# Patient Record
Sex: FEMALE | Race: WHITE | Employment: FULL TIME | URBAN - METROPOLITAN AREA
[De-identification: names, ages, dates, MRNs, and addresses within clinical notes are randomized per-mention and may not be internally consistent; named-entity substitution may affect disease eponyms.]

---

## 2017-02-02 RX ORDER — SERTRALINE HYDROCHLORIDE 100 MG/1
TABLET, FILM COATED ORAL
Qty: 30 TABLET | Refills: 0 | Status: SHIPPED | OUTPATIENT
Start: 2017-02-02 | End: 2019-12-11 | Stop reason: CLARIF

## 2017-05-19 RX ORDER — SERTRALINE HYDROCHLORIDE 100 MG/1
TABLET, FILM COATED ORAL
Qty: 30 TABLET | Refills: 5 | Status: SHIPPED | OUTPATIENT
Start: 2017-05-19 | End: 2018-01-30 | Stop reason: SDUPTHER

## 2018-01-18 RX ORDER — SERTRALINE HYDROCHLORIDE 100 MG/1
TABLET, FILM COATED ORAL
Qty: 30 TABLET | Refills: 4 | OUTPATIENT
Start: 2018-01-18

## 2018-01-19 RX ORDER — SERTRALINE HYDROCHLORIDE 100 MG/1
TABLET, FILM COATED ORAL
Qty: 30 TABLET | Refills: 5 | OUTPATIENT
Start: 2018-01-19

## 2018-01-30 RX ORDER — SERTRALINE HYDROCHLORIDE 100 MG/1
TABLET, FILM COATED ORAL
Qty: 30 TABLET | Refills: 4 | Status: SHIPPED | OUTPATIENT
Start: 2018-01-30 | End: 2018-09-07 | Stop reason: SDUPTHER

## 2018-09-07 RX ORDER — SERTRALINE HYDROCHLORIDE 100 MG/1
TABLET, FILM COATED ORAL
Qty: 30 TABLET | Refills: 3 | Status: SHIPPED | OUTPATIENT
Start: 2018-09-07 | End: 2019-03-13 | Stop reason: SDUPTHER

## 2019-03-06 RX ORDER — SERTRALINE HYDROCHLORIDE 100 MG/1
TABLET, FILM COATED ORAL
Qty: 30 TABLET | Refills: 2 | OUTPATIENT
Start: 2019-03-06

## 2019-03-13 ENCOUNTER — OFFICE VISIT (OUTPATIENT)
Dept: FAMILY MEDICINE CLINIC | Age: 34
End: 2019-03-13
Payer: COMMERCIAL

## 2019-03-13 VITALS
HEART RATE: 96 BPM | DIASTOLIC BLOOD PRESSURE: 82 MMHG | SYSTOLIC BLOOD PRESSURE: 120 MMHG | OXYGEN SATURATION: 98 % | TEMPERATURE: 97.3 F | HEIGHT: 68 IN | WEIGHT: 243 LBS | BODY MASS INDEX: 36.83 KG/M2 | RESPIRATION RATE: 16 BRPM

## 2019-03-13 DIAGNOSIS — R63.5 WEIGHT GAIN: ICD-10-CM

## 2019-03-13 DIAGNOSIS — F32.89 OTHER DEPRESSION: Primary | ICD-10-CM

## 2019-03-13 PROCEDURE — 99214 OFFICE O/P EST MOD 30 MIN: CPT | Performed by: FAMILY MEDICINE

## 2019-03-13 RX ORDER — SERTRALINE HYDROCHLORIDE 100 MG/1
TABLET, FILM COATED ORAL
Qty: 90 TABLET | Refills: 1 | Status: SHIPPED | OUTPATIENT
Start: 2019-03-13 | End: 2019-11-27 | Stop reason: SDUPTHER

## 2019-03-13 ASSESSMENT — PATIENT HEALTH QUESTIONNAIRE - PHQ9
SUM OF ALL RESPONSES TO PHQ QUESTIONS 1-9: 0
1. LITTLE INTEREST OR PLEASURE IN DOING THINGS: 0
SUM OF ALL RESPONSES TO PHQ QUESTIONS 1-9: 0
SUM OF ALL RESPONSES TO PHQ9 QUESTIONS 1 & 2: 0
2. FEELING DOWN, DEPRESSED OR HOPELESS: 0

## 2019-03-14 ASSESSMENT — ENCOUNTER SYMPTOMS
SWOLLEN GLANDS: 0
ABDOMINAL PAIN: 0
VISUAL CHANGE: 0
SORE THROAT: 0
NAUSEA: 0
CHANGE IN BOWEL HABIT: 0
VOMITING: 0

## 2019-11-27 RX ORDER — SERTRALINE HYDROCHLORIDE 100 MG/1
TABLET, FILM COATED ORAL
Qty: 30 TABLET | Refills: 0 | Status: SHIPPED | OUTPATIENT
Start: 2019-11-27 | End: 2019-12-11 | Stop reason: CLARIF

## 2019-12-03 RX ORDER — SERTRALINE HYDROCHLORIDE 100 MG/1
TABLET, FILM COATED ORAL
Qty: 30 TABLET | Refills: 3 | Status: SHIPPED | OUTPATIENT
Start: 2019-12-03 | End: 2019-12-11 | Stop reason: SDUPTHER

## 2019-12-11 ENCOUNTER — OFFICE VISIT (OUTPATIENT)
Dept: FAMILY MEDICINE CLINIC | Age: 34
End: 2019-12-11
Payer: COMMERCIAL

## 2019-12-11 VITALS
SYSTOLIC BLOOD PRESSURE: 114 MMHG | BODY MASS INDEX: 36.68 KG/M2 | HEART RATE: 93 BPM | DIASTOLIC BLOOD PRESSURE: 70 MMHG | HEIGHT: 68 IN | RESPIRATION RATE: 16 BRPM | WEIGHT: 242 LBS | OXYGEN SATURATION: 98 % | TEMPERATURE: 97.6 F

## 2019-12-11 DIAGNOSIS — F32.89 OTHER DEPRESSION: Primary | ICD-10-CM

## 2019-12-11 PROCEDURE — 99213 OFFICE O/P EST LOW 20 MIN: CPT | Performed by: FAMILY MEDICINE

## 2019-12-11 RX ORDER — SERTRALINE HYDROCHLORIDE 100 MG/1
TABLET, FILM COATED ORAL
Qty: 30 TABLET | Refills: 3 | Status: CANCELLED | OUTPATIENT
Start: 2019-12-11

## 2019-12-11 RX ORDER — SERTRALINE HYDROCHLORIDE 100 MG/1
TABLET, FILM COATED ORAL
Qty: 30 TABLET | Refills: 11 | Status: SHIPPED | OUTPATIENT
Start: 2019-12-11 | End: 2020-12-28 | Stop reason: SDUPTHER

## 2020-12-28 ENCOUNTER — VIRTUAL VISIT (OUTPATIENT)
Dept: FAMILY MEDICINE CLINIC | Age: 35
End: 2020-12-28
Payer: COMMERCIAL

## 2020-12-28 PROCEDURE — 99441 PR PHYS/QHP TELEPHONE EVALUATION 5-10 MIN: CPT | Performed by: FAMILY MEDICINE

## 2020-12-28 RX ORDER — SERTRALINE HYDROCHLORIDE 100 MG/1
TABLET, FILM COATED ORAL
Qty: 30 TABLET | Refills: 11 | Status: SHIPPED | OUTPATIENT
Start: 2020-12-28 | End: 2022-02-11 | Stop reason: SDUPTHER

## 2020-12-28 RX ORDER — SERTRALINE HYDROCHLORIDE 100 MG/1
TABLET, FILM COATED ORAL
Qty: 30 TABLET | Refills: 0 | OUTPATIENT
Start: 2020-12-28

## 2020-12-28 RX ORDER — FERROUS SULFATE 325(65) MG
325 TABLET ORAL
COMMUNITY

## 2020-12-28 ASSESSMENT — PATIENT HEALTH QUESTIONNAIRE - PHQ9
1. LITTLE INTEREST OR PLEASURE IN DOING THINGS: 0
SUM OF ALL RESPONSES TO PHQ QUESTIONS 1-9: 0
2. FEELING DOWN, DEPRESSED OR HOPELESS: 0
SUM OF ALL RESPONSES TO PHQ QUESTIONS 1-9: 0
SUM OF ALL RESPONSES TO PHQ QUESTIONS 1-9: 0
SUM OF ALL RESPONSES TO PHQ9 QUESTIONS 1 & 2: 0

## 2020-12-28 ASSESSMENT — ENCOUNTER SYMPTOMS
NAUSEA: 0
VOMITING: 0
CHEST TIGHTNESS: 0

## 2020-12-28 NOTE — PROGRESS NOTES
Subjective:      Patient ID: Lee Ochoa is a 38 y.o. female. Lee Ochoa is a 38 y.o. female evaluated via telephone on 12/28/2020. Consent:  She and/or health care decision maker is aware that that she may receive a bill for this telephone service, depending on her insurance coverage, and has provided verbal consent to proceed: Yes      Documentation:  I communicated with the patient and/or health care decision maker about cc. Details of this discussion including any medical advice provided: yes      I affirm this is a Patient Initiated Episode with a Patient who has not had a related appointment within my department in the past 7 days or scheduled within the next 24 hours. Patient identification was verified at the start of the visit: Yes    Total Time: minutes: 5-10 minutes    Note: not billable if this call serves to triage the patient into an appointment for the relevant concern      Marla Laws  This is a chronic (fu depression w anxiety) problem. The current episode started more than 1 year ago. The problem occurs constantly. The problem has been unchanged. Pertinent negatives include no chest pain, fatigue, nausea or vomiting. Nothing aggravates the symptoms. Treatments tried: sertraline  The treatment provided significant relief. Review of Systems   Constitutional: Negative for activity change, fatigue and unexpected weight change. Respiratory: Negative for chest tightness. Cardiovascular: Negative for chest pain and palpitations. Gastrointestinal: Negative for nausea and vomiting. Psychiatric/Behavioral: Negative for behavioral problems, confusion, dysphoric mood and sleep disturbance. The patient is not nervous/anxious. Objective: Wt 230  No BP reported    Physical Exam  Vitals signs reviewed. Constitutional:       General: She is not in acute distress.   HENT:      Head:      Comments: Hearing intact to nml conversation     Neurological: Mental Status: She is alert and oriented to person, place, and time. Psychiatric:         Mood and Affect: Mood normal.         Assessment:       Diagnosis Orders   1. Depression with anxiety             Plan:       On remission with current tx  Continue same medications no changes needed at this time   We can consider tape down med if needed in future      Fu in 6 months               Ray Miller MD

## 2022-02-11 ENCOUNTER — OFFICE VISIT (OUTPATIENT)
Dept: FAMILY MEDICINE CLINIC | Age: 37
End: 2022-02-11
Payer: COMMERCIAL

## 2022-02-11 VITALS
TEMPERATURE: 98.9 F | RESPIRATION RATE: 16 BRPM | DIASTOLIC BLOOD PRESSURE: 84 MMHG | BODY MASS INDEX: 36.83 KG/M2 | OXYGEN SATURATION: 98 % | WEIGHT: 243 LBS | SYSTOLIC BLOOD PRESSURE: 124 MMHG | HEART RATE: 105 BPM | HEIGHT: 68 IN

## 2022-02-11 DIAGNOSIS — E55.9 VITAMIN D DEFICIENCY: ICD-10-CM

## 2022-02-11 DIAGNOSIS — R53.83 FATIGUE, UNSPECIFIED TYPE: ICD-10-CM

## 2022-02-11 DIAGNOSIS — F41.8 DEPRESSION WITH ANXIETY: Primary | ICD-10-CM

## 2022-02-11 PROCEDURE — 99214 OFFICE O/P EST MOD 30 MIN: CPT | Performed by: FAMILY MEDICINE

## 2022-02-11 RX ORDER — ARIPIPRAZOLE 5 MG/1
5 TABLET ORAL DAILY
Qty: 30 TABLET | Refills: 3 | Status: SHIPPED | OUTPATIENT
Start: 2022-02-11 | End: 2022-03-11 | Stop reason: SDUPTHER

## 2022-02-11 RX ORDER — SERTRALINE HYDROCHLORIDE 100 MG/1
TABLET, FILM COATED ORAL
Qty: 30 TABLET | Refills: 11 | Status: SHIPPED | OUTPATIENT
Start: 2022-02-11 | End: 2022-07-11 | Stop reason: DRUGHIGH

## 2022-02-11 ASSESSMENT — PATIENT HEALTH QUESTIONNAIRE - PHQ9
3. TROUBLE FALLING OR STAYING ASLEEP: 3
10. IF YOU CHECKED OFF ANY PROBLEMS, HOW DIFFICULT HAVE THESE PROBLEMS MADE IT FOR YOU TO DO YOUR WORK, TAKE CARE OF THINGS AT HOME, OR GET ALONG WITH OTHER PEOPLE: 0
1. LITTLE INTEREST OR PLEASURE IN DOING THINGS: 1
7. TROUBLE CONCENTRATING ON THINGS, SUCH AS READING THE NEWSPAPER OR WATCHING TELEVISION: 1
4. FEELING TIRED OR HAVING LITTLE ENERGY: 3
9. THOUGHTS THAT YOU WOULD BE BETTER OFF DEAD, OR OF HURTING YOURSELF: 0
5. POOR APPETITE OR OVEREATING: 3
SUM OF ALL RESPONSES TO PHQ QUESTIONS 1-9: 17
SUM OF ALL RESPONSES TO PHQ QUESTIONS 1-9: 17
6. FEELING BAD ABOUT YOURSELF - OR THAT YOU ARE A FAILURE OR HAVE LET YOURSELF OR YOUR FAMILY DOWN: 1
2. FEELING DOWN, DEPRESSED OR HOPELESS: 3
SUM OF ALL RESPONSES TO PHQ QUESTIONS 1-9: 17
SUM OF ALL RESPONSES TO PHQ QUESTIONS 1-9: 17
SUM OF ALL RESPONSES TO PHQ9 QUESTIONS 1 & 2: 4
8. MOVING OR SPEAKING SO SLOWLY THAT OTHER PEOPLE COULD HAVE NOTICED. OR THE OPPOSITE, BEING SO FIGETY OR RESTLESS THAT YOU HAVE BEEN MOVING AROUND A LOT MORE THAN USUAL: 2

## 2022-02-11 NOTE — PROGRESS NOTES
Subjective:      Patient ID: Henry Ace is a 43 y.o. female. Fatigue  This is a chronic problem. The current episode started more than 1 year ago. The problem occurs intermittently. The problem has been unchanged. Associated symptoms include fatigue. Pertinent negatives include no chest pain. Exacerbated by: depressed mood. Other  This is a chronic (fu depression/anxiety ) problem. The current episode started more than 1 year ago. The problem occurs intermittently. The problem has been gradually worsening. Associated symptoms include fatigue. Pertinent negatives include no chest pain. Exacerbated by: stressors at home. Treatments tried: zoloft 100 mg. The treatment provided mild relief. Hx of Vit d def  Current tx: none       Review of Systems   Constitutional: Positive for fatigue. Negative for activity change, appetite change and unexpected weight change. Respiratory: Negative for chest tightness and shortness of breath. Cardiovascular: Negative for chest pain and palpitations. Skin: Negative for color change. Psychiatric/Behavioral: Positive for decreased concentration and dysphoric mood. Negative for behavioral problems, self-injury, sleep disturbance and suicidal ideas. The patient is nervous/anxious. Objective:  Blood pressure 124/84, pulse 105, temperature 98.9 °F (37.2 °C), temperature source Tympanic, resp. rate 16, height 5' 8\" (1.727 m), weight 243 lb (110.2 kg), last menstrual period 01/21/2022, SpO2 98 %, not currently breastfeeding. Physical Exam  Vitals and nursing note reviewed. Constitutional:       General: She is not in acute distress. Appearance: She is well-developed. She is obese. She is not ill-appearing, toxic-appearing or diaphoretic. HENT:      Head: Normocephalic and atraumatic. Mouth/Throat:      Mouth: Mucous membranes are moist.      Pharynx: Oropharynx is clear. No oropharyngeal exudate or posterior oropharyngeal erythema.    Eyes: General: No scleral icterus. Right eye: No discharge. Left eye: No discharge. Extraocular Movements: Extraocular movements intact. Conjunctiva/sclera: Conjunctivae normal.      Pupils: Pupils are equal, round, and reactive to light. Neck:      Thyroid: No thyromegaly. Vascular: No carotid bruit or JVD. Trachea: No tracheal deviation. Cardiovascular:      Rate and Rhythm: Normal rate and regular rhythm. Pulses: Normal pulses. Heart sounds: Normal heart sounds. No murmur heard. No friction rub. No gallop. Pulmonary:      Effort: Pulmonary effort is normal. No respiratory distress. Breath sounds: Normal breath sounds. No stridor. No wheezing, rhonchi or rales. Chest:      Chest wall: No tenderness. Musculoskeletal:         General: No swelling, tenderness, deformity or signs of injury. Normal range of motion. Cervical back: Normal range of motion and neck supple. No muscular tenderness. Left lower leg: No edema. Lymphadenopathy:      Cervical: No cervical adenopathy. Skin:     General: Skin is warm. Capillary Refill: Capillary refill takes less than 2 seconds. Coloration: Skin is not jaundiced or pale. Findings: No erythema or rash. Neurological:      General: No focal deficit present. Mental Status: She is alert and oriented to person, place, and time. Mental status is at baseline. Cranial Nerves: No cranial nerve deficit. Sensory: No sensory deficit. Motor: No weakness or abnormal muscle tone. Coordination: Coordination normal.      Gait: Gait normal.      Deep Tendon Reflexes: Reflexes are normal and symmetric. Psychiatric:         Attention and Perception: Attention normal.         Mood and Affect: Mood is depressed. Speech: Speech normal.         Behavior: Behavior normal. Behavior is cooperative. Thought Content:  Thought content normal.         Judgment: Judgment normal. Assessment:        Diagnosis Orders   1. Depression with anxiety  sertraline (ZOLOFT) 100 MG tablet    ARIPiprazole (ABILIFY) 5 MG tablet   2. Fatigue, unspecified type  CBC Auto Differential    TSH without Reflex   3. Vitamin D deficiency  Vitamin D 25 Hydroxy           Plan:      1. Deteriorated  Add abilify , continue ssri   Fu 4 weeks    2.  NOS, check blood work as recommended  Fu 4 weeks              Kashif Linares MD

## 2022-02-12 LAB
BASOPHILS ABSOLUTE: 0.1 K/UL (ref 0–0.2)
BASOPHILS RELATIVE PERCENT: 0.6 %
EOSINOPHILS ABSOLUTE: 0.2 K/UL (ref 0–0.6)
EOSINOPHILS RELATIVE PERCENT: 2.1 %
HCT VFR BLD CALC: 36.8 % (ref 36–48)
HEMOGLOBIN: 12.1 G/DL (ref 12–16)
LYMPHOCYTES ABSOLUTE: 1.4 K/UL (ref 1–5.1)
LYMPHOCYTES RELATIVE PERCENT: 16.5 %
MCH RBC QN AUTO: 25.1 PG (ref 26–34)
MCHC RBC AUTO-ENTMCNC: 32.8 G/DL (ref 31–36)
MCV RBC AUTO: 76.4 FL (ref 80–100)
MONOCYTES ABSOLUTE: 0.4 K/UL (ref 0–1.3)
MONOCYTES RELATIVE PERCENT: 5.1 %
NEUTROPHILS ABSOLUTE: 6.6 K/UL (ref 1.7–7.7)
NEUTROPHILS RELATIVE PERCENT: 75.7 %
PDW BLD-RTO: 16 % (ref 12.4–15.4)
PLATELET # BLD: 276 K/UL (ref 135–450)
PMV BLD AUTO: 9.5 FL (ref 5–10.5)
RBC # BLD: 4.82 M/UL (ref 4–5.2)
TSH SERPL DL<=0.05 MIU/L-ACNC: 1.29 UIU/ML (ref 0.27–4.2)
VITAMIN D 25-HYDROXY: 12 NG/ML
WBC # BLD: 8.7 K/UL (ref 4–11)

## 2022-02-14 ASSESSMENT — ENCOUNTER SYMPTOMS
SHORTNESS OF BREATH: 0
CHEST TIGHTNESS: 0
COLOR CHANGE: 0

## 2022-02-15 DIAGNOSIS — E55.9 VITAMIN D DEFICIENCY: Primary | ICD-10-CM

## 2022-02-15 RX ORDER — ERGOCALCIFEROL (VITAMIN D2) 1250 MCG
50000 CAPSULE ORAL WEEKLY
Qty: 12 CAPSULE | Refills: 0 | OUTPATIENT
Start: 2022-02-15 | End: 2022-02-22 | Stop reason: SDUPTHER

## 2022-02-22 ENCOUNTER — PATIENT MESSAGE (OUTPATIENT)
Dept: FAMILY MEDICINE CLINIC | Age: 37
End: 2022-02-22

## 2022-02-22 DIAGNOSIS — E55.9 VITAMIN D DEFICIENCY: ICD-10-CM

## 2022-02-22 NOTE — TELEPHONE ENCOUNTER
Medication:   Requested Prescriptions     Pending Prescriptions Disp Refills    ergocalciferol (DRISDOL) 1.25 MG (27368 UT) capsule 12 capsule 0     Sig: Take 1 capsule by mouth once a week for 12 days        Last Filled:      Patient Phone Number: [phone] (home)     Last appt: 2/11/2022   Next appt: 3/11/2022    Last OARRS: No flowsheet data found.

## 2022-02-22 NOTE — TELEPHONE ENCOUNTER
From: Boubacar Cochran  To: Dr. Hess Smart: 2/22/2022 11:32 AM EST  Subject: Vitamin D    On Tuesday 2/15 I was called with my blood test results and was told that a prescription was bring called in and the pharmacy hasn't received it.

## 2022-02-23 RX ORDER — ERGOCALCIFEROL (VITAMIN D2) 1250 MCG
50000 CAPSULE ORAL WEEKLY
Qty: 12 CAPSULE | Refills: 0 | Status: SHIPPED | OUTPATIENT
Start: 2022-02-23 | End: 2022-03-22

## 2022-03-18 ENCOUNTER — TELEPHONE (OUTPATIENT)
Dept: FAMILY MEDICINE CLINIC | Age: 37
End: 2022-03-18

## 2022-03-18 RX ORDER — ARIPIPRAZOLE 10 MG/1
10 TABLET ORAL DAILY
Qty: 30 TABLET | Refills: 3 | Status: SHIPPED | OUTPATIENT
Start: 2022-03-18 | End: 2022-07-11 | Stop reason: SDUPTHER

## 2022-03-18 NOTE — TELEPHONE ENCOUNTER
rx transmitted electronically to the pharmacy via [facility]   Let patient know and verify pharmacy

## 2022-03-18 NOTE — TELEPHONE ENCOUNTER
Pharmacy tech states the abilify 5mg take 2 tabs daily is not covered for pt to take 2 tabs. Requesting for Abilify 10mg 1 tab daily to be sent over.   Please advise  Last OV:  3/11/2022

## 2022-03-22 DIAGNOSIS — F51.04 PSYCHOPHYSIOLOGICAL INSOMNIA: ICD-10-CM

## 2022-03-22 DIAGNOSIS — E55.9 VITAMIN D DEFICIENCY: ICD-10-CM

## 2022-03-22 RX ORDER — HYDROXYZINE HYDROCHLORIDE 25 MG/1
25 TABLET, FILM COATED ORAL NIGHTLY
Qty: 30 TABLET | Refills: 0 | Status: SHIPPED | OUTPATIENT
Start: 2022-03-22 | End: 2022-04-01 | Stop reason: ALTCHOICE

## 2022-03-22 RX ORDER — ERGOCALCIFEROL 1.25 MG/1
CAPSULE ORAL
Qty: 12 CAPSULE | Refills: 0 | Status: SHIPPED | OUTPATIENT
Start: 2022-03-22 | End: 2022-05-31

## 2022-03-22 NOTE — TELEPHONE ENCOUNTER
Medication:   Requested Prescriptions     Pending Prescriptions Disp Refills    vitamin D (ERGOCALCIFEROL) 1.25 MG (05839 UT) CAPS capsule [Pharmacy Med Name: Vitamin D (Ergocalciferol) Oral Capsule 1.25 MG (84462 UT)] 12 capsule 0     Sig: TAKE 1 CAPSULE BY MOUTH ONE TIME A WEEK FOR 12 WEEKS    hydrOXYzine (ATARAX) 25 MG tablet [Pharmacy Med Name: hydrOXYzine HCl Oral Tablet 25 MG] 30 tablet 0     Sig: TAKE 1 TABLET BY MOUTH NIGHTLY. Last Filled:      Patient Phone Number: [phone] (home)     Last appt: 3/11/2022   Next appt: 4/1/2022    Last OARRS: No flowsheet data found.

## 2022-04-01 ENCOUNTER — OFFICE VISIT (OUTPATIENT)
Dept: FAMILY MEDICINE CLINIC | Age: 37
End: 2022-04-01
Payer: COMMERCIAL

## 2022-04-01 VITALS
BODY MASS INDEX: 36.53 KG/M2 | HEART RATE: 83 BPM | WEIGHT: 241 LBS | HEIGHT: 68 IN | RESPIRATION RATE: 14 BRPM | OXYGEN SATURATION: 98 % | TEMPERATURE: 97.2 F | SYSTOLIC BLOOD PRESSURE: 122 MMHG | DIASTOLIC BLOOD PRESSURE: 80 MMHG

## 2022-04-01 DIAGNOSIS — Z00.00 ENCOUNTER FOR WELL ADULT EXAM WITHOUT ABNORMAL FINDINGS: ICD-10-CM

## 2022-04-01 DIAGNOSIS — E55.9 VITAMIN D DEFICIENCY: ICD-10-CM

## 2022-04-01 DIAGNOSIS — F51.04 PSYCHOPHYSIOLOGICAL INSOMNIA: ICD-10-CM

## 2022-04-01 DIAGNOSIS — Z00.00 WELL ADULT EXAM: Primary | ICD-10-CM

## 2022-04-01 DIAGNOSIS — F41.8 DEPRESSION WITH ANXIETY: ICD-10-CM

## 2022-04-01 PROCEDURE — 99213 OFFICE O/P EST LOW 20 MIN: CPT | Performed by: FAMILY MEDICINE

## 2022-04-01 PROCEDURE — 99395 PREV VISIT EST AGE 18-39: CPT | Performed by: FAMILY MEDICINE

## 2022-04-01 RX ORDER — TRAZODONE HYDROCHLORIDE 50 MG/1
50 TABLET ORAL NIGHTLY
Qty: 30 TABLET | Refills: 1 | Status: SHIPPED | OUTPATIENT
Start: 2022-04-01 | End: 2022-07-11 | Stop reason: ALTCHOICE

## 2022-04-01 ASSESSMENT — PATIENT HEALTH QUESTIONNAIRE - PHQ9
1. LITTLE INTEREST OR PLEASURE IN DOING THINGS: 0
2. FEELING DOWN, DEPRESSED OR HOPELESS: 0
SUM OF ALL RESPONSES TO PHQ QUESTIONS 1-9: 0
SUM OF ALL RESPONSES TO PHQ9 QUESTIONS 1 & 2: 0
SUM OF ALL RESPONSES TO PHQ QUESTIONS 1-9: 0

## 2022-04-01 ASSESSMENT — ENCOUNTER SYMPTOMS
RHINORRHEA: 0
WHEEZING: 0
TROUBLE SWALLOWING: 0
CHEST TIGHTNESS: 0
NAUSEA: 0
ABDOMINAL PAIN: 0
EYE REDNESS: 0
EYE ITCHING: 0
SHORTNESS OF BREATH: 0
VOMITING: 0

## 2022-04-01 NOTE — PROGRESS NOTES
UT) CAPS capsule TAKE 1 CAPSULE BY MOUTH ONE TIME A WEEK FOR 12 Christine Porter MD   ARIPiprazole (ABILIFY) 10 MG tablet Take 1 tablet by mouth daily  Sharilyn Sicard, MD   sertraline (ZOLOFT) 100 MG tablet One po qd  Sharilyn Sicard, MD   ferrous sulfate (IRON 325) 325 (65 Fe) MG tablet Take 325 mg by mouth daily (with breakfast)  Historical Provider, MD       Past Medical History:   Diagnosis Date    Anxiety     dx at 22 yo was tx with paxil     Depression     first episode at 30, second episode at 32,        Past Surgical History:   Procedure Laterality Date    WISDOM TOOTH EXTRACTION         Family History   Problem Relation Age of Onset    Depression Mother        Social History     Tobacco Use    Smoking status: Never Smoker    Smokeless tobacco: Never Used   Substance Use Topics    Alcohol use: Yes     Alcohol/week: 0.0 standard drinks     Comment: occ    Drug use: No       Objective   /80   Pulse 83   Temp 97.2 °F (36.2 °C)   Resp 14   Ht 5' 8\" (1.727 m)   Wt 241 lb (109.3 kg)   LMP 03/15/2022   SpO2 98%   BMI 36.64 kg/m²   Wt Readings from Last 3 Encounters:   04/01/22 241 lb (109.3 kg)   02/11/22 243 lb (110.2 kg)   12/11/19 242 lb (109.8 kg)     There were no vitals filed for this visit. Physical Exam  Vitals and nursing note reviewed. Constitutional:       General: She is not in acute distress. Appearance: Normal appearance. She is well-developed. She is obese. She is not ill-appearing, toxic-appearing or diaphoretic. HENT:      Head: Normocephalic and atraumatic. Right Ear: Tympanic membrane, ear canal and external ear normal. There is no impacted cerumen. Left Ear: Tympanic membrane, ear canal and external ear normal. There is no impacted cerumen. Eyes:      General: No scleral icterus. Right eye: No discharge. Left eye: No discharge. Extraocular Movements: Extraocular movements intact.       Conjunctiva/sclera: Conjunctivae normal. Pupils: Pupils are equal, round, and reactive to light. Neck:      Thyroid: No thyromegaly. Vascular: No carotid bruit or JVD. Trachea: No tracheal deviation. Cardiovascular:      Rate and Rhythm: Normal rate and regular rhythm. Heart sounds: Normal heart sounds. No murmur heard. No friction rub. No gallop. Pulmonary:      Effort: Pulmonary effort is normal. No respiratory distress. Breath sounds: Normal breath sounds. No stridor. No wheezing, rhonchi or rales. Chest:      Chest wall: No tenderness. Abdominal:      General: Abdomen is flat. Bowel sounds are normal. There is no distension. Palpations: Abdomen is soft. There is no mass. Tenderness: There is no abdominal tenderness. There is no guarding or rebound. Musculoskeletal:         General: No swelling, tenderness, deformity or signs of injury. Normal range of motion. Cervical back: Normal range of motion and neck supple. No muscular tenderness. Lymphadenopathy:      Cervical: No cervical adenopathy. Skin:     General: Skin is warm. Capillary Refill: Capillary refill takes less than 2 seconds. Coloration: Skin is not jaundiced or pale. Findings: No erythema or rash. Neurological:      General: No focal deficit present. Mental Status: She is alert and oriented to person, place, and time. Mental status is at baseline. Cranial Nerves: No cranial nerve deficit. Sensory: No sensory deficit. Motor: No weakness or abnormal muscle tone. Coordination: Coordination normal.      Gait: Gait normal.      Deep Tendon Reflexes: Reflexes are normal and symmetric. Psychiatric:         Mood and Affect: Mood normal.         Behavior: Behavior normal.         Thought Content: Thought content normal.         Judgment: Judgment normal.           Assessment   Plan         Diagnosis Orders   1. Well adult:    . Doing well, encourage healthy diet, exercise, safety    .  Screening labs orders given   . Preventive: pap with gyn             Lipid Panel    Hepatitis C Antibody    HIV Screen   2. Psychophysiological insomnia   Stop atarax (not helpint)  Trial with trazodone prn   Fu 1 mo  traZODone (DESYREL) 50 MG tablet   3. Depression with anxiety   Improving  Continue same medications no changes needed at this time   Fu 1 mo         4. Vit D def   Check vit D in 3 mo     Personalized Preventive Plan   Current Health Maintenance Status  Immunization History   Administered Date(s) Administered    COVID-19, J&J, PF, 0.5 mL 04/08/2021, 12/06/2021    Influenza 10/21/2015    Influenza Virus Vaccine 10/06/2021    Tdap (Boostrix, Adacel) 12/16/2015        Health Maintenance   Topic Date Due    Hepatitis C screen  Never done    HIV screen  Never done    Cervical cancer screen  Never done    Diabetes screen  Never done    Depression Monitoring  02/11/2023    DTaP/Tdap/Td vaccine (2 - Td or Tdap) 12/16/2025    Flu vaccine  Completed    COVID-19 Vaccine  Completed    Hepatitis A vaccine  Aged Out    Hepatitis B vaccine  Aged Out    Hib vaccine  Aged Out    Meningococcal (ACWY) vaccine  Aged Out    Pneumococcal 0-64 years Vaccine  Aged Out    Varicella vaccine  Discontinued     Recommendations for [employer] Due: see orders and patient instructions/AVS.    No follow-ups on file.

## 2022-04-04 LAB
CHOLESTEROL, TOTAL: 230 MG/DL (ref 0–199)
HDLC SERPL-MCNC: 44 MG/DL (ref 40–60)
HEPATITIS C ANTIBODY INTERPRETATION: NORMAL
HIV AG/AB: NORMAL
HIV ANTIGEN: NORMAL
HIV-1 ANTIBODY: NORMAL
HIV-2 AB: NORMAL
LDL CHOLESTEROL CALCULATED: 155 MG/DL
TRIGL SERPL-MCNC: 156 MG/DL (ref 0–150)
VLDLC SERPL CALC-MCNC: 31 MG/DL

## 2022-04-21 DIAGNOSIS — F51.04 PSYCHOPHYSIOLOGICAL INSOMNIA: ICD-10-CM

## 2022-04-21 RX ORDER — HYDROXYZINE HYDROCHLORIDE 25 MG/1
25 TABLET, FILM COATED ORAL NIGHTLY
Qty: 30 TABLET | Refills: 5 | Status: SHIPPED | OUTPATIENT
Start: 2022-04-21 | End: 2022-05-21

## 2022-04-21 NOTE — TELEPHONE ENCOUNTER
Medication:   Requested Prescriptions     Pending Prescriptions Disp Refills    hydrOXYzine (ATARAX) 25 MG tablet [Pharmacy Med Name: hydrOXYzine HCl Oral Tablet 25 MG] 30 tablet 0     Sig: TAKE 1 TABLET BY MOUTH NIGHTLY. Last Filled:      Patient Phone Number: [phone] (home)     Last appt: 4/1/2022   Next appt: 7/1/2022    Last OARRS: No flowsheet data found.

## 2022-05-28 DIAGNOSIS — E55.9 VITAMIN D DEFICIENCY: ICD-10-CM

## 2022-05-31 RX ORDER — ERGOCALCIFEROL 1.25 MG/1
CAPSULE ORAL
Qty: 12 CAPSULE | Refills: 0 | Status: SHIPPED | OUTPATIENT
Start: 2022-05-31 | End: 2022-07-05

## 2022-07-02 DIAGNOSIS — E55.9 VITAMIN D DEFICIENCY: ICD-10-CM

## 2022-07-05 RX ORDER — ERGOCALCIFEROL 1.25 MG/1
CAPSULE ORAL
Qty: 12 CAPSULE | Refills: 0 | Status: SHIPPED | OUTPATIENT
Start: 2022-07-05 | End: 2022-08-03

## 2022-07-11 ENCOUNTER — OFFICE VISIT (OUTPATIENT)
Dept: FAMILY MEDICINE CLINIC | Age: 37
End: 2022-07-11
Payer: COMMERCIAL

## 2022-07-11 VITALS
SYSTOLIC BLOOD PRESSURE: 124 MMHG | HEART RATE: 77 BPM | RESPIRATION RATE: 16 BRPM | WEIGHT: 244.5 LBS | DIASTOLIC BLOOD PRESSURE: 82 MMHG | TEMPERATURE: 98.6 F | HEIGHT: 68 IN | BODY MASS INDEX: 37.05 KG/M2 | OXYGEN SATURATION: 98 %

## 2022-07-11 DIAGNOSIS — F41.8 DEPRESSION WITH ANXIETY: Primary | ICD-10-CM

## 2022-07-11 PROCEDURE — 99213 OFFICE O/P EST LOW 20 MIN: CPT | Performed by: FAMILY MEDICINE

## 2022-07-11 RX ORDER — ARIPIPRAZOLE 10 MG/1
10 TABLET ORAL DAILY
Qty: 30 TABLET | Refills: 3 | Status: SHIPPED | OUTPATIENT
Start: 2022-07-11 | End: 2022-10-11 | Stop reason: DRUGHIGH

## 2022-07-11 RX ORDER — SERTRALINE HYDROCHLORIDE 100 MG/1
TABLET, FILM COATED ORAL
Qty: 45 TABLET | Refills: 3 | Status: SHIPPED | OUTPATIENT
Start: 2022-07-11

## 2022-07-11 ASSESSMENT — PATIENT HEALTH QUESTIONNAIRE - PHQ9
2. FEELING DOWN, DEPRESSED OR HOPELESS: 1
SUM OF ALL RESPONSES TO PHQ QUESTIONS 1-9: 12
5. POOR APPETITE OR OVEREATING: 1
1. LITTLE INTEREST OR PLEASURE IN DOING THINGS: 1
6. FEELING BAD ABOUT YOURSELF - OR THAT YOU ARE A FAILURE OR HAVE LET YOURSELF OR YOUR FAMILY DOWN: 1
8. MOVING OR SPEAKING SO SLOWLY THAT OTHER PEOPLE COULD HAVE NOTICED. OR THE OPPOSITE, BEING SO FIGETY OR RESTLESS THAT YOU HAVE BEEN MOVING AROUND A LOT MORE THAN USUAL: 2
SUM OF ALL RESPONSES TO PHQ9 QUESTIONS 1 & 2: 2
9. THOUGHTS THAT YOU WOULD BE BETTER OFF DEAD, OR OF HURTING YOURSELF: 0
3. TROUBLE FALLING OR STAYING ASLEEP: 1
7. TROUBLE CONCENTRATING ON THINGS, SUCH AS READING THE NEWSPAPER OR WATCHING TELEVISION: 2
SUM OF ALL RESPONSES TO PHQ QUESTIONS 1-9: 12
10. IF YOU CHECKED OFF ANY PROBLEMS, HOW DIFFICULT HAVE THESE PROBLEMS MADE IT FOR YOU TO DO YOUR WORK, TAKE CARE OF THINGS AT HOME, OR GET ALONG WITH OTHER PEOPLE: 1
SUM OF ALL RESPONSES TO PHQ QUESTIONS 1-9: 12
4. FEELING TIRED OR HAVING LITTLE ENERGY: 3
SUM OF ALL RESPONSES TO PHQ QUESTIONS 1-9: 12

## 2022-07-11 NOTE — PROGRESS NOTES
Subjective:      Patient ID: Sammy Mata is a 42 y.o. female. Other  This is a chronic (fu depression/) problem. Episode frequency: unchanged, is ok, noticed poor attention. The problem has been unchanged. Nothing aggravates the symptoms. Treatments tried: zoloft 100, abilify 10. The treatment provided moderate relief. Review of Systems    Objective:  Blood pressure 124/82, pulse 77, temperature 98.6 °F (37 °C), temperature source Tympanic, resp. rate 16, height 5' 8\" (1.727 m), weight 244 lb 8 oz (110.9 kg), last menstrual period 06/19/2022, SpO2 98 %, not currently breastfeeding. Physical Exam  Vitals and nursing note reviewed. Constitutional:       General: She is not in acute distress. Appearance: Normal appearance. She is obese. She is not ill-appearing, toxic-appearing or diaphoretic. HENT:      Head: Normocephalic and atraumatic. Pulmonary:      Effort: No respiratory distress. Musculoskeletal:      Cervical back: Normal range of motion. Neurological:      General: No focal deficit present. Mental Status: She is alert and oriented to person, place, and time. Mental status is at baseline. Psychiatric:         Mood and Affect: Mood normal.         Behavior: Behavior normal.         Thought Content: Thought content normal.         Judgment: Judgment normal.         Assessment:       Diagnosis Orders   1. Depression with anxiety  ARIPiprazole (ABILIFY) 10 MG tablet    sertraline (ZOLOFT) 100 MG tablet           Plan:      PHQ-9 = 12   Increase Zoloft to 150 mg   Continue Abilify  encourage life style changes.     Fu 3 mo          Hussein Jacobson MD

## 2022-08-03 DIAGNOSIS — E55.9 VITAMIN D DEFICIENCY: ICD-10-CM

## 2022-08-03 RX ORDER — ERGOCALCIFEROL 1.25 MG/1
CAPSULE ORAL
Qty: 12 CAPSULE | Refills: 0 | Status: SHIPPED | OUTPATIENT
Start: 2022-08-03 | End: 2022-08-31

## 2022-08-03 NOTE — TELEPHONE ENCOUNTER
Medication:   Requested Prescriptions     Pending Prescriptions Disp Refills    vitamin D (ERGOCALCIFEROL) 1.25 MG (21843 UT) CAPS capsule [Pharmacy Med Name: Vitamin D (Ergocalciferol) Oral Capsule 1.25 MG (68008 UT)] 12 capsule 0     Sig: TAKE 1 CAPSULE BY MOUTH ONE TIME A WEEK FOR 12 WEEKS        Last Filled:      Patient Phone Number: [phone] (home)     Last appt: 7/11/2022   Next appt: 10/11/2022    Last OARRS: No flowsheet data found.

## 2022-08-30 DIAGNOSIS — E55.9 VITAMIN D DEFICIENCY: ICD-10-CM

## 2022-08-31 RX ORDER — ERGOCALCIFEROL 1.25 MG/1
CAPSULE ORAL
Qty: 12 CAPSULE | Refills: 0 | Status: SHIPPED | OUTPATIENT
Start: 2022-08-31

## 2022-10-11 ENCOUNTER — OFFICE VISIT (OUTPATIENT)
Dept: FAMILY MEDICINE CLINIC | Age: 37
End: 2022-10-11
Payer: COMMERCIAL

## 2022-10-11 VITALS
WEIGHT: 251 LBS | HEART RATE: 67 BPM | BODY MASS INDEX: 38.04 KG/M2 | TEMPERATURE: 97.5 F | SYSTOLIC BLOOD PRESSURE: 135 MMHG | DIASTOLIC BLOOD PRESSURE: 86 MMHG | OXYGEN SATURATION: 97 % | HEIGHT: 68 IN

## 2022-10-11 DIAGNOSIS — Z13.1 SCREENING FOR DIABETES MELLITUS: ICD-10-CM

## 2022-10-11 DIAGNOSIS — F41.8 DEPRESSION WITH ANXIETY: Primary | ICD-10-CM

## 2022-10-11 LAB
ANION GAP SERPL CALCULATED.3IONS-SCNC: 11 MMOL/L (ref 3–16)
BUN BLDV-MCNC: 7 MG/DL (ref 7–20)
CALCIUM SERPL-MCNC: 9.2 MG/DL (ref 8.3–10.6)
CHLORIDE BLD-SCNC: 103 MMOL/L (ref 99–110)
CO2: 26 MMOL/L (ref 21–32)
CREAT SERPL-MCNC: 0.9 MG/DL (ref 0.6–1.1)
GFR AFRICAN AMERICAN: >60
GFR NON-AFRICAN AMERICAN: >60
GLUCOSE BLD-MCNC: 122 MG/DL (ref 70–99)
POTASSIUM SERPL-SCNC: 3.8 MMOL/L (ref 3.5–5.1)
SODIUM BLD-SCNC: 140 MMOL/L (ref 136–145)

## 2022-10-11 PROCEDURE — 99213 OFFICE O/P EST LOW 20 MIN: CPT | Performed by: FAMILY MEDICINE

## 2022-10-11 RX ORDER — ARIPIPRAZOLE 10 MG/1
10 TABLET ORAL DAILY
Qty: 30 TABLET | Refills: 3 | Status: SHIPPED | COMMUNITY
Start: 2022-10-11

## 2022-10-11 NOTE — PROGRESS NOTES
Subjective:      Patient ID: Naomi More is a 42 y.o. female. Other  This is a chronic (fu on depression/anxiety) problem. The current episode started more than 1 year ago. The problem occurs intermittently. Progression since onset: stable. Nothing aggravates the symptoms. Treatments tried: sertraline 150 mg, abilify 10 mg \"has not noticed any difference with this medication\" The treatment provided moderate relief. Review of Systems    Objective:  Blood pressure 135/86, pulse 67, temperature 97.5 °F (36.4 °C), height 5' 8\" (1.727 m), weight 215 lb (97.5 kg), last menstrual period 09/27/2022, SpO2 97 %, not currently breastfeeding. Physical Exam  Vitals and nursing note reviewed. Constitutional:       General: She is not in acute distress. Appearance: Normal appearance. She is obese. She is not ill-appearing, toxic-appearing or diaphoretic. HENT:      Head: Normocephalic and atraumatic. Pulmonary:      Effort: No respiratory distress. Musculoskeletal:      Cervical back: Normal range of motion. Neurological:      General: No focal deficit present. Mental Status: She is alert and oriented to person, place, and time. Mental status is at baseline. Psychiatric:         Mood and Affect: Mood normal.         Behavior: Behavior normal.         Thought Content: Thought content normal.       Assessment:       Diagnosis Orders   1. Depression with anxiety  ARIPiprazole (ABILIFY) 10 MG tablet      2. Screening for diabetes mellitus  Basic Metabolic Panel              Plan:      Her sx are on partial remission  She is to take zoloft 150 mg and stop Abilify as instructed (wt gain, poor response)  Patient to call if symptoms persist or worsen.      2. Check bmp , pt does have hx of gestational dm   Exercise recommended      Flu vaccine utd          Jose Rafael Beltre MD never

## 2022-12-02 DIAGNOSIS — F41.8 DEPRESSION WITH ANXIETY: ICD-10-CM

## 2022-12-02 RX ORDER — ARIPIPRAZOLE 10 MG/1
TABLET ORAL
Qty: 30 TABLET | Refills: 5 | Status: SHIPPED | OUTPATIENT
Start: 2022-12-02 | End: 2022-12-06

## 2022-12-02 NOTE — TELEPHONE ENCOUNTER
Medication:   Requested Prescriptions     Pending Prescriptions Disp Refills    ARIPiprazole (ABILIFY) 10 MG tablet [Pharmacy Med Name: ARIPiprazole Oral Tablet 10 MG] 30 tablet 0     Sig: TAKE 1 TABLET BY MOUTH EVERY DAY        Last Filled:      Patient Phone Number: [phone] (home)     Last appt: 10/11/2022   Next appt: 4/11/2023    Last OARRS: No flowsheet data found.

## 2022-12-04 DIAGNOSIS — F41.8 DEPRESSION WITH ANXIETY: ICD-10-CM

## 2022-12-05 NOTE — TELEPHONE ENCOUNTER
Medication:   Requested Prescriptions     Pending Prescriptions Disp Refills    ARIPiprazole (ABILIFY) 10 MG tablet [Pharmacy Med Name: ARIPiprazole Oral Tablet 10 MG] 30 tablet 0     Sig: TAKE 1 TABLET BY MOUTH EVERY DAY        Last Filled:      Patient Phone Number: [phone] (home)     Last appt: 10/11/2022   Next appt: 4/11/2023    Last OARRS: No flowsheet data found. home

## 2022-12-06 RX ORDER — ARIPIPRAZOLE 10 MG/1
TABLET ORAL
Qty: 30 TABLET | Refills: 5 | Status: SHIPPED | OUTPATIENT
Start: 2022-12-06

## 2022-12-29 ENCOUNTER — TELEPHONE (OUTPATIENT)
Dept: FAMILY MEDICINE CLINIC | Age: 37
End: 2022-12-29
Payer: COMMERCIAL

## 2022-12-29 DIAGNOSIS — R73.9 HYPERGLYCEMIA: Primary | ICD-10-CM

## 2022-12-29 PROCEDURE — 36415 COLL VENOUS BLD VENIPUNCTURE: CPT | Performed by: FAMILY MEDICINE

## 2022-12-29 NOTE — TELEPHONE ENCOUNTER
----- Message from Quintin Herman sent at 12/29/2022  9:11 AM EST -----  Subject: Referral Request    Reason for referral request? A1C labs  Provider patient wants to be referred to(if known): Esteban Oates    Provider Phone Number(if known):     Additional Information for Provider? she was in for labs in October she   needs to get A1C she was calling in to see if she needed a new order or   needed to make an appointment  ---------------------------------------------------------------------------  --------------  [address]    [phone]; OK to leave message on voicemail  ---------------------------------------------------------------------------  --------------

## 2023-02-04 DIAGNOSIS — F41.8 DEPRESSION WITH ANXIETY: ICD-10-CM

## 2023-02-06 RX ORDER — SERTRALINE HYDROCHLORIDE 100 MG/1
TABLET, FILM COATED ORAL
Qty: 30 TABLET | Refills: 3 | Status: SHIPPED | OUTPATIENT
Start: 2023-02-06

## 2023-02-06 NOTE — TELEPHONE ENCOUNTER
Medication:   Requested Prescriptions     Pending Prescriptions Disp Refills    sertraline (ZOLOFT) 100 MG tablet [Pharmacy Med Name: Sertraline HCl Oral Tablet 100 MG] 30 tablet 0     Sig: TAKE 1 TABLET BY MOUTH EVERY DAY        Last Filled:      Patient Phone Number: [phone] (home)     Last appt: 10/11/2022   Next appt: 4/11/2023    Last OARRS: No flowsheet data found.

## 2023-04-24 ENCOUNTER — OFFICE VISIT (OUTPATIENT)
Dept: FAMILY MEDICINE CLINIC | Age: 38
End: 2023-04-24
Payer: COMMERCIAL

## 2023-04-24 VITALS
RESPIRATION RATE: 16 BRPM | OXYGEN SATURATION: 98 % | TEMPERATURE: 98 F | WEIGHT: 243.2 LBS | HEART RATE: 75 BPM | SYSTOLIC BLOOD PRESSURE: 118 MMHG | HEIGHT: 68 IN | BODY MASS INDEX: 36.86 KG/M2 | DIASTOLIC BLOOD PRESSURE: 76 MMHG

## 2023-04-24 DIAGNOSIS — F41.8 DEPRESSION WITH ANXIETY: ICD-10-CM

## 2023-04-24 DIAGNOSIS — E66.01 SEVERE OBESITY (BMI 35.0-39.9) WITH COMORBIDITY (HCC): ICD-10-CM

## 2023-04-24 DIAGNOSIS — E55.9 VITAMIN D DEFICIENCY: ICD-10-CM

## 2023-04-24 DIAGNOSIS — R73.03 PRE-DIABETES: ICD-10-CM

## 2023-04-24 DIAGNOSIS — E78.5 HYPERLIPIDEMIA, UNSPECIFIED HYPERLIPIDEMIA TYPE: Primary | ICD-10-CM

## 2023-04-24 PROCEDURE — 99214 OFFICE O/P EST MOD 30 MIN: CPT | Performed by: FAMILY MEDICINE

## 2023-04-24 RX ORDER — SERTRALINE HYDROCHLORIDE 100 MG/1
TABLET, FILM COATED ORAL
Qty: 30 TABLET | Refills: 5 | Status: SHIPPED | OUTPATIENT
Start: 2023-04-24

## 2023-04-24 SDOH — ECONOMIC STABILITY: INCOME INSECURITY: HOW HARD IS IT FOR YOU TO PAY FOR THE VERY BASICS LIKE FOOD, HOUSING, MEDICAL CARE, AND HEATING?: NOT HARD AT ALL

## 2023-04-24 SDOH — ECONOMIC STABILITY: FOOD INSECURITY: WITHIN THE PAST 12 MONTHS, THE FOOD YOU BOUGHT JUST DIDN'T LAST AND YOU DIDN'T HAVE MONEY TO GET MORE.: NEVER TRUE

## 2023-04-24 SDOH — ECONOMIC STABILITY: FOOD INSECURITY: WITHIN THE PAST 12 MONTHS, YOU WORRIED THAT YOUR FOOD WOULD RUN OUT BEFORE YOU GOT MONEY TO BUY MORE.: NEVER TRUE

## 2023-04-24 SDOH — ECONOMIC STABILITY: HOUSING INSECURITY
IN THE LAST 12 MONTHS, WAS THERE A TIME WHEN YOU DID NOT HAVE A STEADY PLACE TO SLEEP OR SLEPT IN A SHELTER (INCLUDING NOW)?: NO

## 2023-04-24 ASSESSMENT — PATIENT HEALTH QUESTIONNAIRE - PHQ9
8. MOVING OR SPEAKING SO SLOWLY THAT OTHER PEOPLE COULD HAVE NOTICED. OR THE OPPOSITE, BEING SO FIGETY OR RESTLESS THAT YOU HAVE BEEN MOVING AROUND A LOT MORE THAN USUAL: 0
SUM OF ALL RESPONSES TO PHQ QUESTIONS 1-9: 4
4. FEELING TIRED OR HAVING LITTLE ENERGY: 1
9. THOUGHTS THAT YOU WOULD BE BETTER OFF DEAD, OR OF HURTING YOURSELF: 0
SUM OF ALL RESPONSES TO PHQ QUESTIONS 1-9: 4
10. IF YOU CHECKED OFF ANY PROBLEMS, HOW DIFFICULT HAVE THESE PROBLEMS MADE IT FOR YOU TO DO YOUR WORK, TAKE CARE OF THINGS AT HOME, OR GET ALONG WITH OTHER PEOPLE: 0
5. POOR APPETITE OR OVEREATING: 1
1. LITTLE INTEREST OR PLEASURE IN DOING THINGS: 0
SUM OF ALL RESPONSES TO PHQ QUESTIONS 1-9: 4
3. TROUBLE FALLING OR STAYING ASLEEP: 1
SUM OF ALL RESPONSES TO PHQ QUESTIONS 1-9: 4
7. TROUBLE CONCENTRATING ON THINGS, SUCH AS READING THE NEWSPAPER OR WATCHING TELEVISION: 1
6. FEELING BAD ABOUT YOURSELF - OR THAT YOU ARE A FAILURE OR HAVE LET YOURSELF OR YOUR FAMILY DOWN: 0
SUM OF ALL RESPONSES TO PHQ9 QUESTIONS 1 & 2: 0
2. FEELING DOWN, DEPRESSED OR HOPELESS: 0

## 2023-04-24 NOTE — PROGRESS NOTES
Subjective:      Patient ID: Giselle Sin is a 42 y.o. female. Depression w anxiety:   Doing well, denies depressed mood  No sec effects with Zoloft    Hyperlipidemia  This is a chronic problem. The current episode started more than 1 month ago. The problem is uncontrolled. Recent lipid tests were reviewed and are high. Exacerbating diseases include obesity. There are no known factors aggravating her hyperlipidemia. Current antihyperlipidemic treatment includes diet change. There are no compliance problems. Vit D def:   Took rx vit D but not taking vit d now     Obesity:  Cut down on carbs and started to exercise. Wt went from 251 to 243     Pre dm  Has lost weight with diet and exercise  denies polyuria/polydipsia/polyphagia/paresthesias/wt gain        Review of Systems    Objective:  Blood pressure 118/76, pulse 75, temperature 98 °F (36.7 °C), temperature source Tympanic, resp. rate 16, height 5' 8\" (1.727 m), weight 243 lb 3.2 oz (110.3 kg), last menstrual period 04/15/2023, SpO2 98 %, not currently breastfeeding. Physical Exam  Vitals and nursing note reviewed. Constitutional:       General: She is not in acute distress. Appearance: Normal appearance. She is well-developed. She is obese. She is not ill-appearing, toxic-appearing or diaphoretic. HENT:      Head: Normocephalic and atraumatic. Eyes:      General: No scleral icterus. Extraocular Movements: Extraocular movements intact. Conjunctiva/sclera: Conjunctivae normal.      Pupils: Pupils are equal, round, and reactive to light. Neck:      Thyroid: No thyromegaly. Vascular: No JVD. Comments: No carotid bruits  Cardiovascular:      Rate and Rhythm: Normal rate and regular rhythm. Pulses: Normal pulses. Carotid pulses are 2+ on the right side and 2+ on the left side. Heart sounds: Normal heart sounds. No murmur heard. No friction rub. No gallop.       Comments: No edema    Pulmonary:

## 2023-11-15 DIAGNOSIS — F41.8 DEPRESSION WITH ANXIETY: ICD-10-CM

## 2023-11-16 NOTE — TELEPHONE ENCOUNTER
Medication:   Requested Prescriptions     Pending Prescriptions Disp Refills    sertraline (ZOLOFT) 100 MG tablet [Pharmacy Med Name: Sertraline HCl Oral Tablet 100 MG] 30 tablet 0     Sig: TAKE 1 TABLET BY MOUTH EVERY DAY        Last Filled:      Patient Phone Number: [phone] (home)     Last appt: 4/24/2023   Next appt: Visit date not found    Last OARRS:        No data to display

## 2023-11-19 RX ORDER — SERTRALINE HYDROCHLORIDE 100 MG/1
TABLET, FILM COATED ORAL
Qty: 90 TABLET | Refills: 0 | Status: SHIPPED | OUTPATIENT
Start: 2023-11-19